# Patient Record
Sex: MALE | Race: WHITE | NOT HISPANIC OR LATINO | Employment: UNEMPLOYED | URBAN - METROPOLITAN AREA
[De-identification: names, ages, dates, MRNs, and addresses within clinical notes are randomized per-mention and may not be internally consistent; named-entity substitution may affect disease eponyms.]

---

## 2017-02-06 ENCOUNTER — HISTORICAL (OUTPATIENT)
Dept: ADMINISTRATIVE | Facility: HOSPITAL | Age: 42
End: 2017-02-06

## 2021-06-08 ENCOUNTER — OCCUPATIONAL HEALTH (OUTPATIENT)
Dept: URGENT CARE | Facility: CLINIC | Age: 46
End: 2021-06-08

## 2021-06-08 PROCEDURE — 80305 PR NON-DOT DRUG SCREENS: ICD-10-PCS | Mod: S$GLB,,, | Performed by: EMERGENCY MEDICINE

## 2021-06-08 PROCEDURE — 80305 DRUG TEST PRSMV DIR OPT OBS: CPT | Mod: S$GLB,,, | Performed by: EMERGENCY MEDICINE

## 2022-05-19 ENCOUNTER — HOSPITAL ENCOUNTER (EMERGENCY)
Facility: HOSPITAL | Age: 47
Discharge: HOME | End: 2022-05-19
Attending: EMERGENCY MEDICINE

## 2022-05-19 VITALS
OXYGEN SATURATION: 100 % | SYSTOLIC BLOOD PRESSURE: 130 MMHG | TEMPERATURE: 99 F | RESPIRATION RATE: 16 BRPM | HEART RATE: 81 BPM | WEIGHT: 180 LBS | DIASTOLIC BLOOD PRESSURE: 89 MMHG

## 2022-05-19 DIAGNOSIS — F41.9 ANXIETY: ICD-10-CM

## 2022-05-19 DIAGNOSIS — R20.2 PARESTHESIAS: Primary | ICD-10-CM

## 2022-05-19 LAB
ALBUMIN SERPL BCP-MCNC: 4.8 G/DL (ref 3.5–5.2)
ALP SERPL-CCNC: 72 U/L (ref 55–135)
ALT SERPL W/O P-5'-P-CCNC: 29 U/L (ref 10–44)
AMPHET+METHAMPHET UR QL: NEGATIVE
ANION GAP SERPL CALC-SCNC: 13 MMOL/L (ref 8–16)
AST SERPL-CCNC: 27 U/L (ref 10–40)
BARBITURATES UR QL SCN>200 NG/ML: NEGATIVE
BASOPHILS # BLD AUTO: 0.07 K/UL (ref 0–0.2)
BASOPHILS NFR BLD: 0.6 % (ref 0–1.9)
BENZODIAZ UR QL SCN>200 NG/ML: NEGATIVE
BILIRUB SERPL-MCNC: 0.9 MG/DL (ref 0.1–1)
BNP SERPL-MCNC: 16 PG/ML (ref 0–99)
BUN SERPL-MCNC: 18 MG/DL (ref 6–20)
BZE UR QL SCN: NEGATIVE
CALCIUM SERPL-MCNC: 8.8 MG/DL (ref 8.7–10.5)
CANNABINOIDS UR QL SCN: ABNORMAL
CHLORIDE SERPL-SCNC: 96 MMOL/L (ref 95–110)
CO2 SERPL-SCNC: 25 MMOL/L (ref 23–29)
CREAT SERPL-MCNC: 1 MG/DL (ref 0.5–1.4)
CREAT UR-MCNC: 10 MG/DL (ref 23–375)
DIFFERENTIAL METHOD: ABNORMAL
EOSINOPHIL # BLD AUTO: 0.1 K/UL (ref 0–0.5)
EOSINOPHIL NFR BLD: 0.8 % (ref 0–8)
ERYTHROCYTE [DISTWIDTH] IN BLOOD BY AUTOMATED COUNT: 13.8 % (ref 11.5–14.5)
EST. GFR  (AFRICAN AMERICAN): >60 ML/MIN/1.73 M^2
EST. GFR  (NON AFRICAN AMERICAN): >60 ML/MIN/1.73 M^2
GLUCOSE SERPL-MCNC: 61 MG/DL (ref 70–110)
HCT VFR BLD AUTO: 45.3 % (ref 40–54)
HGB BLD-MCNC: 15.1 G/DL (ref 14–18)
IMM GRANULOCYTES # BLD AUTO: 0.04 K/UL (ref 0–0.04)
IMM GRANULOCYTES NFR BLD AUTO: 0.3 % (ref 0–0.5)
INR PPP: 1.2
LYMPHOCYTES # BLD AUTO: 2.1 K/UL (ref 1–4.8)
LYMPHOCYTES NFR BLD: 18.3 % (ref 18–48)
MCH RBC QN AUTO: 28.6 PG (ref 27–31)
MCHC RBC AUTO-ENTMCNC: 33.3 G/DL (ref 32–36)
MCV RBC AUTO: 86 FL (ref 82–98)
MONOCYTES # BLD AUTO: 0.9 K/UL (ref 0.3–1)
MONOCYTES NFR BLD: 7.3 % (ref 4–15)
NEUTROPHILS # BLD AUTO: 8.5 K/UL (ref 1.8–7.7)
NEUTROPHILS NFR BLD: 72.7 % (ref 38–73)
NRBC BLD-RTO: 0 /100 WBC
OPIATES UR QL SCN: NEGATIVE
PCP UR QL SCN>25 NG/ML: NEGATIVE
PLATELET # BLD AUTO: 341 K/UL (ref 150–450)
PMV BLD AUTO: 10.2 FL (ref 9.2–12.9)
POTASSIUM SERPL-SCNC: 3.2 MMOL/L (ref 3.5–5.1)
PROT SERPL-MCNC: 7.8 G/DL (ref 6–8.4)
PROTHROMBIN TIME: 14.4 SEC (ref 11.4–13.7)
RBC # BLD AUTO: 5.28 M/UL (ref 4.6–6.2)
SODIUM SERPL-SCNC: 134 MMOL/L (ref 136–145)
TOXICOLOGY INFORMATION: ABNORMAL
TROPONIN I SERPL DL<=0.01 NG/ML-MCNC: <0.03 NG/ML
WBC # BLD AUTO: 11.72 K/UL (ref 3.9–12.7)

## 2022-05-19 PROCEDURE — 96376 TX/PRO/DX INJ SAME DRUG ADON: CPT

## 2022-05-19 PROCEDURE — 84484 ASSAY OF TROPONIN QUANT: CPT | Performed by: EMERGENCY MEDICINE

## 2022-05-19 PROCEDURE — 85025 COMPLETE CBC W/AUTO DIFF WBC: CPT | Performed by: EMERGENCY MEDICINE

## 2022-05-19 PROCEDURE — 63600175 PHARM REV CODE 636 W HCPCS: Performed by: EMERGENCY MEDICINE

## 2022-05-19 PROCEDURE — 83880 ASSAY OF NATRIURETIC PEPTIDE: CPT | Performed by: EMERGENCY MEDICINE

## 2022-05-19 PROCEDURE — 25000003 PHARM REV CODE 250: Performed by: EMERGENCY MEDICINE

## 2022-05-19 PROCEDURE — 80307 DRUG TEST PRSMV CHEM ANLYZR: CPT | Performed by: EMERGENCY MEDICINE

## 2022-05-19 PROCEDURE — 96374 THER/PROPH/DIAG INJ IV PUSH: CPT

## 2022-05-19 PROCEDURE — 85610 PROTHROMBIN TIME: CPT | Performed by: EMERGENCY MEDICINE

## 2022-05-19 PROCEDURE — 99284 EMERGENCY DEPT VISIT MOD MDM: CPT | Mod: 25

## 2022-05-19 PROCEDURE — 80053 COMPREHEN METABOLIC PANEL: CPT | Performed by: EMERGENCY MEDICINE

## 2022-05-19 PROCEDURE — 93010 EKG 12-LEAD: ICD-10-PCS | Mod: ,,, | Performed by: SPECIALIST

## 2022-05-19 PROCEDURE — 93005 ELECTROCARDIOGRAM TRACING: CPT | Performed by: SPECIALIST

## 2022-05-19 PROCEDURE — 93010 ELECTROCARDIOGRAM REPORT: CPT | Mod: ,,, | Performed by: SPECIALIST

## 2022-05-19 RX ORDER — NAPROXEN SODIUM 220 MG/1
324 TABLET, FILM COATED ORAL ONCE
Status: COMPLETED | OUTPATIENT
Start: 2022-05-19 | End: 2022-05-19

## 2022-05-19 RX ORDER — LOSARTAN POTASSIUM 50 MG/1
50 TABLET ORAL DAILY
COMMUNITY
Start: 2022-02-19

## 2022-05-19 RX ORDER — POTASSIUM CHLORIDE 750 MG/1
50 CAPSULE, EXTENDED RELEASE ORAL ONCE
Status: COMPLETED | OUTPATIENT
Start: 2022-05-19 | End: 2022-05-19

## 2022-05-19 RX ORDER — ESCITALOPRAM OXALATE 20 MG/1
30 TABLET ORAL DAILY
COMMUNITY
Start: 2022-04-04

## 2022-05-19 RX ORDER — ASPIRIN 81 MG/1
81 TABLET ORAL DAILY
COMMUNITY

## 2022-05-19 RX ORDER — LORAZEPAM 2 MG/ML
1 INJECTION INTRAMUSCULAR
Status: COMPLETED | OUTPATIENT
Start: 2022-05-19 | End: 2022-05-19

## 2022-05-19 RX ORDER — OMEPRAZOLE 20 MG/1
20 CAPSULE, DELAYED RELEASE ORAL DAILY
COMMUNITY

## 2022-05-19 RX ADMIN — ASPIRIN 81 MG 324 MG: 81 TABLET ORAL at 06:05

## 2022-05-19 RX ADMIN — LORAZEPAM 1 MG: 2 INJECTION INTRAMUSCULAR; INTRAVENOUS at 07:05

## 2022-05-19 RX ADMIN — LORAZEPAM 1 MG: 2 INJECTION INTRAMUSCULAR; INTRAVENOUS at 06:05

## 2022-05-19 RX ADMIN — POTASSIUM CHLORIDE 50 MEQ: 750 CAPSULE, EXTENDED RELEASE ORAL at 08:05

## 2022-05-19 NOTE — ED PROVIDER NOTES
Encounter Date: 5/19/2022       History     Chief Complaint   Patient presents with    Numbness     Bilateral hand numbness beginning 30min ago.      47-year-old male presented to emergency department with anxiety and felt that his hands or tingling and numb and cold.  Patient very anxious upon arrival.  Patient denies any fever chills or nausea vomiting.  Patient states hands feel numb and cold and has tingling bilaterally.  Denies any focal weakness.  Denies chest pain or shortness of breath.  Patient admits using marijuana.        Review of patient's allergies indicates:  No Known Allergies  History reviewed. No pertinent past medical history.  No past surgical history on file.  No family history on file.     Review of Systems   Constitutional: Negative.    HENT: Negative.    Eyes: Negative.    Respiratory: Negative.    Cardiovascular: Negative.    Gastrointestinal: Negative.    Endocrine: Negative.    Genitourinary: Negative.    Musculoskeletal: Negative.    Skin: Negative.    Allergic/Immunologic: Negative.    Neurological: Negative.         Tingling of the hands   Hematological: Negative.    Psychiatric/Behavioral: The patient is nervous/anxious.    All other systems reviewed and are negative.      Physical Exam     Initial Vitals [05/19/22 1702]   BP Pulse Resp Temp SpO2   (!) 169/96 110 20 98.5 °F (36.9 °C) 100 %      MAP       --         Physical Exam    Nursing note and vitals reviewed.  Constitutional: He appears well-developed and well-nourished.   HENT:   Head: Normocephalic and atraumatic.   Nose: Nose normal.   Eyes: Conjunctivae and EOM are normal.   Neck: No tracheal deviation present.   Normal range of motion.  Cardiovascular: Normal rate, regular rhythm, normal heart sounds and intact distal pulses. Exam reveals no friction rub.    No murmur heard.  Pulmonary/Chest: Breath sounds normal. No respiratory distress. He has no wheezes. He has no rales. He exhibits no tenderness.   Abdominal: Abdomen  is soft. He exhibits no distension. There is no abdominal tenderness.   Musculoskeletal:         General: No tenderness. Normal range of motion.      Cervical back: Normal range of motion.     Neurological: He is alert and oriented to person, place, and time. He has normal strength. He displays normal reflexes. No sensory deficit. GCS score is 15. GCS eye subscore is 4. GCS verbal subscore is 5. GCS motor subscore is 6.   Skin: Skin is warm and dry. Capillary refill takes less than 2 seconds.   Psychiatric: He has a normal mood and affect. Thought content normal.         ED Course   Procedures  Labs Reviewed   CBC W/ AUTO DIFFERENTIAL - Abnormal; Notable for the following components:       Result Value    Gran # (ANC) 8.5 (*)     All other components within normal limits   COMPREHENSIVE METABOLIC PANEL - Abnormal; Notable for the following components:    Sodium 134 (*)     Potassium 3.2 (*)     Glucose 61 (*)     All other components within normal limits   PROTIME-INR - Abnormal; Notable for the following components:    PT 14.4 (*)     All other components within normal limits   DRUG SCREEN PANEL, URINE EMERGENCY - Abnormal; Notable for the following components:    THC Presumptive Positive (*)     Creatinine, Urine 10.0 (*)     All other components within normal limits    Narrative:     Specimen Source->Urine   B-TYPE NATRIURETIC PEPTIDE   TROPONIN I     EKG Readings: (Independently Interpreted)   Initial Reading: No STEMI. Rhythm: Normal Sinus Rhythm. Ectopy: No Ectopy. Conduction: Normal.       Imaging Results          X-Ray Chest AP Portable (Final result)  Result time 05/19/22 17:49:42    Final result by Lissette Tena IV, MD (05/19/22 17:49:42)                 Narrative:    Chest, single view    HISTORY: Chest pain.    No prior exam is available for comparison.    The heart size is within normal limits and there is no evidence of acute pulmonary vascular engorgement.    The lungs are appropriately expanded.  There are no confluent areas of airspace disease or significant volume loss and there is no effusion. There is mild biapical pleural thickening.    IMPRESSION:    No acute cardiopulmonary disease.    Electronically signed by:  Lissette Tena MD  5/19/2022 5:49 PM CDT Workstation: [id number]                               Medications   aspirin chewable tablet 324 mg (324 mg Oral Given 5/19/22 1818)   lorazepam injection 1 mg (1 mg Intravenous Given 5/19/22 1819)   lorazepam injection 1 mg (1 mg Intravenous Given 5/19/22 1920)   potassium chloride CR capsule 50 mEq (50 mEq Oral Given 5/19/22 2035)     Medical Decision Making:   Differential Diagnosis:   47-year-old male presented emergency department with anxiety.  Patient complained of tingling and numbness of both hands and was very anxious upon arrival and felt he is hand side not having circulation and or cold.  Patient has good radial pulses bilaterally however the hands appear to be cold and placed in warm water and then patient's symptoms improved.  Screening labs and cardiac workup reviewed and unremarkable and patient advised to stop using drugs.  Patient said he used marijuana prior to this.  Patient gradually felt better after treatment with benzodiazepines and as feeling better and workup negative discharged with instructions and follow-up.  Patient neurologically intact at this time.  Hypokalemia treated  Clinical Tests:   Lab Tests: Reviewed  Radiological Study: Reviewed  Medical Tests: Reviewed                      Clinical Impression:   Final diagnoses:  [R20.2] Paresthesias (Primary)  [F41.9] Anxiety          ED Disposition Condition    Discharge Stable        ED Prescriptions     None        Follow-up Information     Follow up With Specialties Details Why Contact [facility]  In 2 days  [address] [facility]  [place] [address]  [phone]      [facility], Psychiatry In 2 days  [address]  [facility] [address]  [phone]             Wiliam Batres MD  05/19/22 2038

## 2022-05-20 NOTE — DISCHARGE INSTRUCTIONS
Drink lots of fluids.  Rest.  Return to emergency department for worsening symptoms or any problems.  Do not do drugs.

## 2024-01-21 ENCOUNTER — HOSPITAL ENCOUNTER (EMERGENCY)
Facility: HOSPITAL | Age: 49
Discharge: LEFT BEFORE DISCHARGE | End: 2024-01-21
Attending: STUDENT IN AN ORGANIZED HEALTH CARE EDUCATION/TRAINING PROGRAM
Payer: MEDICAID

## 2024-01-21 VITALS
WEIGHT: 166 LBS | SYSTOLIC BLOOD PRESSURE: 138 MMHG | TEMPERATURE: 98 F | RESPIRATION RATE: 20 BRPM | OXYGEN SATURATION: 99 % | HEART RATE: 109 BPM | DIASTOLIC BLOOD PRESSURE: 98 MMHG | BODY MASS INDEX: 26.06 KG/M2 | HEIGHT: 67 IN

## 2024-01-21 DIAGNOSIS — R20.9 COLD HANDS: Primary | ICD-10-CM

## 2024-01-21 DIAGNOSIS — R20.2 LEFT HAND PARESTHESIA: ICD-10-CM

## 2024-01-21 DIAGNOSIS — F12.90 MARIJUANA USE: ICD-10-CM

## 2024-01-21 LAB
ALBUMIN SERPL BCP-MCNC: 4.4 G/DL (ref 3.5–5.2)
ALP SERPL-CCNC: 79 U/L (ref 55–135)
ALT SERPL W/O P-5'-P-CCNC: 17 U/L (ref 10–44)
ANION GAP SERPL CALC-SCNC: 7 MMOL/L (ref 8–16)
AST SERPL-CCNC: 20 U/L (ref 10–40)
BASOPHILS # BLD AUTO: 0.06 K/UL (ref 0–0.2)
BASOPHILS NFR BLD: 0.6 % (ref 0–1.9)
BILIRUB SERPL-MCNC: 0.6 MG/DL (ref 0.1–1)
BUN SERPL-MCNC: 8 MG/DL (ref 6–20)
CALCIUM SERPL-MCNC: 9.1 MG/DL (ref 8.7–10.5)
CHLORIDE SERPL-SCNC: 105 MMOL/L (ref 95–110)
CO2 SERPL-SCNC: 26 MMOL/L (ref 23–29)
CREAT SERPL-MCNC: 1 MG/DL (ref 0.5–1.4)
DIFFERENTIAL METHOD BLD: NORMAL
EOSINOPHIL # BLD AUTO: 0.1 K/UL (ref 0–0.5)
EOSINOPHIL NFR BLD: 1 % (ref 0–8)
ERYTHROCYTE [DISTWIDTH] IN BLOOD BY AUTOMATED COUNT: 13.2 % (ref 11.5–14.5)
EST. GFR  (NO RACE VARIABLE): >60 ML/MIN/1.73 M^2
GLUCOSE SERPL-MCNC: 84 MG/DL (ref 70–110)
GLUCOSE SERPL-MCNC: 86 MG/DL (ref 70–110)
HCT VFR BLD AUTO: 46.8 % (ref 40–54)
HGB BLD-MCNC: 15.7 G/DL (ref 14–18)
IMM GRANULOCYTES # BLD AUTO: 0.04 K/UL (ref 0–0.04)
IMM GRANULOCYTES NFR BLD AUTO: 0.4 % (ref 0–0.5)
LYMPHOCYTES # BLD AUTO: 2.5 K/UL (ref 1–4.8)
LYMPHOCYTES NFR BLD: 23.7 % (ref 18–48)
MCH RBC QN AUTO: 29.3 PG (ref 27–31)
MCHC RBC AUTO-ENTMCNC: 33.5 G/DL (ref 32–36)
MCV RBC AUTO: 87 FL (ref 82–98)
MONOCYTES # BLD AUTO: 0.7 K/UL (ref 0.3–1)
MONOCYTES NFR BLD: 6.7 % (ref 4–15)
NEUTROPHILS # BLD AUTO: 7.1 K/UL (ref 1.8–7.7)
NEUTROPHILS NFR BLD: 67.6 % (ref 38–73)
NRBC BLD-RTO: 0 /100 WBC
PLATELET # BLD AUTO: 256 K/UL (ref 150–450)
PMV BLD AUTO: 11.1 FL (ref 9.2–12.9)
POTASSIUM SERPL-SCNC: 3.6 MMOL/L (ref 3.5–5.1)
PROT SERPL-MCNC: 6.9 G/DL (ref 6–8.4)
RBC # BLD AUTO: 5.36 M/UL (ref 4.6–6.2)
SODIUM SERPL-SCNC: 138 MMOL/L (ref 136–145)
WBC # BLD AUTO: 10.49 K/UL (ref 3.9–12.7)

## 2024-01-21 PROCEDURE — 99284 EMERGENCY DEPT VISIT MOD MDM: CPT | Mod: 25

## 2024-01-21 PROCEDURE — 85025 COMPLETE CBC W/AUTO DIFF WBC: CPT | Performed by: STUDENT IN AN ORGANIZED HEALTH CARE EDUCATION/TRAINING PROGRAM

## 2024-01-21 PROCEDURE — 82962 GLUCOSE BLOOD TEST: CPT

## 2024-01-21 PROCEDURE — 93005 ELECTROCARDIOGRAM TRACING: CPT | Performed by: INTERNAL MEDICINE

## 2024-01-21 PROCEDURE — 80053 COMPREHEN METABOLIC PANEL: CPT | Performed by: STUDENT IN AN ORGANIZED HEALTH CARE EDUCATION/TRAINING PROGRAM

## 2024-01-21 PROCEDURE — 93010 ELECTROCARDIOGRAM REPORT: CPT | Mod: ,,, | Performed by: INTERNAL MEDICINE

## 2024-01-21 NOTE — ED NOTES
Pt left with IV before being discharged. Pt was seen leaving the ER on the cameras by security. Pt phone went straight to voicemail. [place] police notified and sent to pt address. Pt not at the resident.

## 2024-01-21 NOTE — DISCHARGE INSTRUCTIONS

## 2024-01-21 NOTE — ED PROVIDER NOTES
Encounter Date: 1/21/2024       History     Chief Complaint   Patient presents with    Cold Extremity     Pt says he has cold hands but is still having a normal pulse ox and that's not possible.  Pt says he had TMI but didn't have a CT or was ever admitted to the hospital     Patient presents for evaluation of cold hands.  Both his hands are cold.  He denies any other symptoms.  He smells like marijuana.      Review of patient's allergies indicates:  No Known Allergies  No past medical history on file.  No past surgical history on file.  No family history on file.     Review of Systems    Physical Exam     Initial Vitals   BP Pulse Resp Temp SpO2   01/21/24 0309 01/21/24 0307 01/21/24 0307 01/21/24 0307 01/21/24 0307   (!) 138/98 109 20 98.1 °F (36.7 °C) 99 %      MAP       --                Physical Exam    Nursing note and vitals reviewed.  Constitutional: He appears well-developed and well-nourished.   HENT:   Head: Normocephalic and atraumatic.   Mouth/Throat: Oropharynx is clear and moist.   Eyes: Conjunctivae and EOM are normal. Pupils are equal, round, and reactive to light.   Neck: No thyromegaly present.   Normal range of motion.  Cardiovascular:  Normal rate, regular rhythm and intact distal pulses.           Pulmonary/Chest: Breath sounds normal. No respiratory distress. He has no wheezes.   Abdominal: Abdomen is soft. Bowel sounds are normal. He exhibits no distension. There is no abdominal tenderness.   Musculoskeletal:         General: No tenderness or edema. Normal range of motion.      Cervical back: Normal range of motion.     Neurological: He is alert and oriented to person, place, and time. He has normal strength. No cranial nerve deficit.   Skin: Skin is warm and dry. No rash noted.   Psychiatric: He has a normal mood and affect. His behavior is normal. Thought content normal.         ED Course   Procedures  Labs Reviewed   COMPREHENSIVE METABOLIC PANEL - Abnormal; Notable for the following  components:       Result Value    Anion Gap 7 (*)     All other components within normal limits   CBC W/ AUTO DIFFERENTIAL   POCT GLUCOSE   POCT GLUCOSE MONITORING CONTINUOUS     EKG Readings: (Independently Interpreted)   EKG with regular rate, regular rhythm, normal axis, no acute ST elevations or depressions, normal MN, QRS and QT interval. Interpreted by me.           Imaging Results    None          Medications - No data to display  Medical Decision Making  48-year-old male presents for evaluation of cold hands.  Vitals normal.  Patient has normal sensation in his bilateral hands, normal cap refill.  He has normal distal pulses, doubt arterial injury.  Screening labs within normal limits.  Doubt life-threatening cause of patient's sensation of cold hands.  He has no discernible neurological deficits to warrant advanced imaging or suggest acute neurological disease.  Will refer to PCP as an outpatient.  Stable for discharge with outpatient follow-up.    Amount and/or Complexity of Data Reviewed  Labs: ordered.               ED Course as of 01/21/24 0452   Sun Jan 21, 2024   0451 POCT glucose [BS]   0451 CBC auto differential [BS]   0451 Comprehensive metabolic panel(!) [BS]      ED Course User Index  [BS] Keron Kelsey MD                           Clinical Impression:  Final diagnoses:  [R20.2] Left hand paresthesia  [R20.9] Cold hands (Primary)  [F12.90] Marijuana use          ED Disposition Condition    Discharge Stable          ED Prescriptions    None       Follow-up Information       Follow up With Specialties Details Why Contact [facility]    [employer] [facility]  Schedule an appointment as soon as possible for a visit in 1 day to establish primary care physician [facility] [address] [facility] [address]  [phone]               Keron Kelsey MD  01/21/24 0457

## 2024-02-02 ENCOUNTER — HOSPITAL ENCOUNTER (EMERGENCY)
Facility: HOSPITAL | Age: 49
Discharge: HOME | End: 2024-02-03
Attending: EMERGENCY MEDICINE
Payer: MEDICAID

## 2024-02-02 DIAGNOSIS — R20.2 TINGLING IN EXTREMITIES: Primary | ICD-10-CM

## 2024-02-02 DIAGNOSIS — G45.9 TIA (TRANSIENT ISCHEMIC ATTACK): ICD-10-CM

## 2024-02-02 PROCEDURE — 99285 EMERGENCY DEPT VISIT HI MDM: CPT

## 2024-02-03 VITALS
WEIGHT: 170 LBS | TEMPERATURE: 98 F | HEIGHT: 67 IN | RESPIRATION RATE: 18 BRPM | SYSTOLIC BLOOD PRESSURE: 122 MMHG | BODY MASS INDEX: 26.68 KG/M2 | OXYGEN SATURATION: 95 % | HEART RATE: 63 BPM | DIASTOLIC BLOOD PRESSURE: 79 MMHG

## 2024-02-03 LAB
ALBUMIN SERPL BCP-MCNC: 4 G/DL (ref 3.5–5.2)
ALP SERPL-CCNC: 76 U/L (ref 55–135)
ALT SERPL W/O P-5'-P-CCNC: 14 U/L (ref 10–44)
ANION GAP SERPL CALC-SCNC: 7 MMOL/L (ref 8–16)
AST SERPL-CCNC: 16 U/L (ref 10–40)
BASOPHILS # BLD AUTO: 0.06 K/UL (ref 0–0.2)
BASOPHILS NFR BLD: 0.8 % (ref 0–1.9)
BILIRUB SERPL-MCNC: 0.3 MG/DL (ref 0.1–1)
BUN SERPL-MCNC: 13 MG/DL (ref 6–20)
CALCIUM SERPL-MCNC: 8.8 MG/DL (ref 8.7–10.5)
CHLORIDE SERPL-SCNC: 105 MMOL/L (ref 95–110)
CO2 SERPL-SCNC: 26 MMOL/L (ref 23–29)
CREAT SERPL-MCNC: 1 MG/DL (ref 0.5–1.4)
DIFFERENTIAL METHOD BLD: NORMAL
EOSINOPHIL # BLD AUTO: 0.3 K/UL (ref 0–0.5)
EOSINOPHIL NFR BLD: 4.2 % (ref 0–8)
ERYTHROCYTE [DISTWIDTH] IN BLOOD BY AUTOMATED COUNT: 13 % (ref 11.5–14.5)
EST. GFR  (NO RACE VARIABLE): >60 ML/MIN/1.73 M^2
GLUCOSE SERPL-MCNC: 92 MG/DL (ref 70–110)
HCT VFR BLD AUTO: 44.1 % (ref 40–54)
HGB BLD-MCNC: 14.5 G/DL (ref 14–18)
IMM GRANULOCYTES # BLD AUTO: 0.02 K/UL (ref 0–0.04)
IMM GRANULOCYTES NFR BLD AUTO: 0.3 % (ref 0–0.5)
LYMPHOCYTES # BLD AUTO: 2.5 K/UL (ref 1–4.8)
LYMPHOCYTES NFR BLD: 33 % (ref 18–48)
MCH RBC QN AUTO: 28.5 PG (ref 27–31)
MCHC RBC AUTO-ENTMCNC: 32.9 G/DL (ref 32–36)
MCV RBC AUTO: 87 FL (ref 82–98)
MONOCYTES # BLD AUTO: 0.5 K/UL (ref 0.3–1)
MONOCYTES NFR BLD: 7 % (ref 4–15)
NEUTROPHILS # BLD AUTO: 4.1 K/UL (ref 1.8–7.7)
NEUTROPHILS NFR BLD: 54.7 % (ref 38–73)
NRBC BLD-RTO: 0 /100 WBC
PLATELET # BLD AUTO: 226 K/UL (ref 150–450)
PMV BLD AUTO: 11.5 FL (ref 9.2–12.9)
POTASSIUM SERPL-SCNC: 3.7 MMOL/L (ref 3.5–5.1)
PROT SERPL-MCNC: 6.2 G/DL (ref 6–8.4)
RBC # BLD AUTO: 5.09 M/UL (ref 4.6–6.2)
SODIUM SERPL-SCNC: 138 MMOL/L (ref 136–145)
TROPONIN I SERPL HS-MCNC: <2.3 PG/ML (ref 0–14.9)
WBC # BLD AUTO: 7.54 K/UL (ref 3.9–12.7)

## 2024-02-03 PROCEDURE — 25500020 PHARM REV CODE 255: Performed by: EMERGENCY MEDICINE

## 2024-02-03 PROCEDURE — 80053 COMPREHEN METABOLIC PANEL: CPT | Performed by: EMERGENCY MEDICINE

## 2024-02-03 PROCEDURE — 85025 COMPLETE CBC W/AUTO DIFF WBC: CPT | Performed by: EMERGENCY MEDICINE

## 2024-02-03 PROCEDURE — 84484 ASSAY OF TROPONIN QUANT: CPT | Performed by: EMERGENCY MEDICINE

## 2024-02-03 RX ORDER — GABAPENTIN 300 MG/1
300 CAPSULE ORAL 3 TIMES DAILY
Qty: 90 CAPSULE | Refills: 11 | Status: SHIPPED | OUTPATIENT
Start: 2024-02-03 | End: 2025-02-02

## 2024-02-03 RX ADMIN — IOHEXOL 100 ML: 350 INJECTION, SOLUTION INTRAVENOUS at 04:02

## 2024-02-03 NOTE — ED PROVIDER NOTES
"Encounter Date: 2/2/2024       History     Chief Complaint   Patient presents with    Tingling     Pt says he may have had  "TMI" about a year ago, but that he didn't any CT scans at that time.  Pt said "TMIs" run in his family.  Pt says at that time he felt like his hand was in warm water.  Pt says he now needs a neurologist referral for the TMI     48 year male with no significant past medical history presents secondary to tingling sensation in his left upper extremity.  Patient states he has had issues like this previously and was supposed to go to a neurologist but has not had follow-up since then.  Patient states his symptoms began over a year ago and has not been solved.  Denies any chest pain, shortness breath, nausea, vomiting, fever, chills or sweats associated.  Patient is otherwise stable and has no other complaints.      Review of patient's allergies indicates:  No Known Allergies  No past medical history on file.  No past surgical history on file.  No family history on file.     Review of Systems   Neurological:  Positive for numbness (Left upper extremity with tingling sensation).   All other systems reviewed and are negative.      Physical Exam     Initial Vitals [02/02/24 2257]   BP Pulse Resp Temp SpO2   (!) 133/92 92 18 97.6 °F (36.4 °C) 97 %      MAP       --         Physical Exam    Nursing note and vitals reviewed.  Constitutional: He appears well-developed and well-nourished. He is not diaphoretic. No distress.   HENT:   Head: Normocephalic and atraumatic.   Mouth/Throat: Oropharynx is clear and moist.   Eyes: Conjunctivae and EOM are normal. Pupils are equal, round, and reactive to light.   Neck: Neck supple. No thyromegaly present. No JVD present.   Normal range of motion.  Cardiovascular:  Normal rate, regular rhythm and normal heart sounds.     Exam reveals no gallop and no friction rub.       No murmur heard.  Pulmonary/Chest: Breath sounds normal. No respiratory distress. He has no " wheezes. He exhibits no tenderness.   Abdominal: Abdomen is soft. Bowel sounds are normal. He exhibits no distension. There is no abdominal tenderness. There is no rebound and no guarding.   Musculoskeletal:         General: No tenderness or edema. Normal range of motion.      Cervical back: Normal range of motion and neck supple.     Neurological: He is alert and oriented to person, place, and time. He has normal strength. A sensory deficit (left upper extremity) is present. No cranial nerve deficit.   Skin: Skin is warm and dry. Capillary refill takes less than 2 seconds. No rash noted. No erythema.   Psychiatric: He has a normal mood and affect.         ED Course   Procedures  Labs Reviewed   COMPREHENSIVE METABOLIC PANEL - Abnormal; Notable for the following components:       Result Value    Anion Gap 7 (*)     All other components within normal limits   CBC W/ AUTO DIFFERENTIAL   TROPONIN I HIGH SENSITIVITY          Imaging Results              CTA Head and Neck (xpd) (Final result)  Result time 02/03/24 04:53:17   Procedure changed from CTA Neck     Final result by Adilson Underwood MD (02/03/24 04:53:17)                   Narrative:    EXAM DESCRIPTION:  CTA HEAD AND NECK (XPD) 2/3/2024 4:25 AM CST  RadLex: CT HEAD NECK ANGIOGRAPHY WITH IV CONTRAST    CLINICAL HISTORY:  48 years Male, tia    COMPARISON:  None    TECHNIQUE: Multiple axial images of the head and neck were obtained following a dynamic injection of intravenous contrast using a CT angio protocol. Thick slab MIP images of the head and neck were also obtained. Measurements of carotid stenosis are based on NASCET criteria.    The protocol utilizes one or more of the following dose reduction techniques: automated exposure control, adjustment of mA and/or kV according to patient size, and/or use of iterative reconstruction technique.    FINDINGS:    CTA BRAIN:  INTERNAL CAROTID ARTERIES: The visualized portions of the internal carotid arteries are patent  bilaterally.    MIDDLE CEREBRAL ARTERIES: The right M1 segment is hypoplastic relative to the left. The middle cerebral arteries are patent bilaterally.    ANTERIOR CEREBRAL ARTERIES: The anterior cerebral arteries are patent bilaterally.    ANTERIOR COMMUNICATING ARTERY: Tiny infundibulum grossly measuring 1.7 mm is appreciated. The anterior communicating artery is patent.    POSTERIOR CEREBRAL ARTERIES: Fetal origin is visualized on the right. The posterior cerebral arteries are patent bilaterally.    VERTEBRAL ARTERIES: The visualized portions of the distal vertebral arteries are patent bilaterally. Vertebrobasilar junction appears normal.    BASILAR ARTERY: The basilar artery is patent. Basilar tip appears unremarkable.    SUPERIOR CEREBELLAR ARTERIES: The superior cerebellar arteries are patent bilaterally.    ANTERIOR INFERIOR CEREBELLAR ARTERIES: The anterior inferior cerebellar arteries are not well visualized bilaterally.    POSTERIOR INFERIOR CEREBELLAR ARTERIES: The posterior inferior cerebellar arteries are patent bilaterally.    POSTERIOR COMMUNICATING ARTERY: The right posterior communicating artery is patent. The left vessel is not well-visualized.    VASCULAR MALFORMATIONS: No evidence of aneurysm or AVM is identified.    CTA NECK:  AORTIC ARCH: The visualized portion of the aortic arch appears unremarkable. The origins of the brachiocephalic trunk, left common carotid artery, and left subclavian artery appear unremarkable. Few nonspecific calcified and borderline prominent upper mediastinal lymph nodes are noted.    COMMON CAROTID ARTERIES: The common carotid arteries are patent bilaterally.    CAROTID BULBS: The carotid bulbs are patent bilaterally. No measurable stenosis is seen based on NASCET criteria.    INTERNAL CAROTID ARTERIES: The cervical portions of both internal carotid arteries are patent. No evidence of arterial dissection is seen.    EXTERNAL CAROTID ARTERIES: The visualized portions  of the proximal external carotid arteries are patent.    VERTEBRAL ARTERIES: The vertebral arteries are patent bilaterally. No evidence of dissection is seen.    SUBCLAVIAN ARTERIES: The visualized portions of the subclavian arteries are patent bilaterally.    IMPRESSION:  No evidence of large vessel occlusion or hemodynamically significant stenosis.    Please refer to body detailed report above for additional findings.    Electronically signed by:  Adilson Underwood MD  02/03/2024 04:53 AM CST Workstation: [id number]                                     CT Head Without Contrast (Final result)  Result time 02/03/24 04:45:22      Final result by Adilson Underwood MD (02/03/24 04:45:22)                   Narrative:    EXAM DESCRIPTION:  CT HEAD WITHOUT CONTRAST 2/3/2024 4:24 AM CST  Rad[facility]: CT HEAD WITHOUT IV CONTRAST    CLINICAL HISTORY:  48 years Male, Transient ischemic attack (TIA)    COMPARISON:  None    TECHNIQUE: Axial thin section CT images of the brain were obtained from the base of the skull to the vertex without intravenous contrast. Sagittal and coronal reconstructed images were also obtained.    The protocol utilizes one or more of the following dose reduction techniques: automated exposure control, adjustment of mA and/or kV according to patient size, and/or use of iterative reconstruction technique.    FINDINGS:    BRAIN: Gray-white matter differentiation is preserved. No evidence of an acute transcortical infarct is noted. Please note that sensitivity for subtle small acute infarct is suboptimal on CT.    CEREBROSPINAL FLUID SPACES: Ventricles, cerebral sulci, and basal cisterns are normal for patient's age. No hydrocephalus or ventricular entrapment is noted. No extraaxial fluid collection is noted.    MASS EFFECT: There is no mass effect or midline shift.    HEMORRHAGE: No intracranial hemorrhage is noted.    SELLA: The sella and suprasellar cistern appear unremarkable.    PARANASAL SINUSES: The visualized  portions of the paranasal sinuses demonstrate mucosal thickening.    MASTOID AIR CELLS: The visualized portions of the mastoid air cells are well aerated.    ORBITS: The visualized portions of the orbits appear unremarkable.    SOFT TISSUES: No scalp soft tissue swelling is noted.    CALVARIUM: No calvarial fracture is noted.    ATHEROSCLEROSIS: No calcified intracranial atherosclerosis is noted.    IMPRESSION:    NO ACUTE INTRACRANIAL BLEED, MIDLINE SHIFT, OR MASS EFFECT IS NOTED.    Electronically signed by:  Adilson Underwood MD  02/03/2024 04:45 AM [facility] Workstation: [id number]                                     Medications   sodium chloride 0.9% bolus 1,000 mL 1,000 mL (has no administration in time range)   iohexoL (OMNIPAQUE 350) injection 100 mL (100 mLs Intravenous Given 2/3/24 7269)     Medical Decision Making  [age] year male initial assessment in mild distress secondary to left upper extremity tingling sensation.  Patient is alert and oriented x3.  He is nontoxic-appearing and vitals stable at this time.    Differential diagnosis TIA, electrolyte abnormality, peripheral neuropathy    Amount and/or Complexity of Data Reviewed  Labs: ordered. Decision-making details documented in ED Course.  Radiology: ordered. Decision-making details documented in ED Course.    Risk  Prescription drug management.  Risk Details: Patient has been reassessed noted to have no acute changes in his condition.  Labs images unremarkable for any acute pathology requiring further hospital admission or treatment this time.  Patient will follow up with Neurology as instructed receive MRI as needed.  He is remained stable while in the ED and discharged home stable condition with follow-up as discussed.  Mr. Hathaway is aware of the plan and agreement with discharge.    Patient's plan and diagnosis was discussed. All questions were answered and patient was comfortable with the plan. This patient was personally seen and personally  examined by me and I personally performed the services described in this documentation.   Complexity of the visit is established by the note or I have spent at least the amount of time discussing findings, exam and/or radiographs or imaging studies.     MD uses EPIC and voice recognition software prone to occasional and minor errors that may persist in the medical record.                                        Clinical Impression:  Final diagnoses:  [R20.2] Tingling in extremities (Primary)  [G45.9] TIA (transient ischemic attack)          ED Disposition Condition    Discharge Stable          ED Prescriptions       Medication Sig Dispense Start Date End Date Auth. Provider    gabapentin (NEURONTIN) 300 MG capsule Take 1 capsule (300 mg total) by mouth 3 (three) times daily. 90 capsule 2/3/2024 2/2/2025 Simon Ruiz MD          Follow-up Information       Follow up With Specialties Details Why Contact Info Additional Information    [facility] - Emergency Dept Emergency Medicine  As needed, If symptoms worsen [address] [facility] [place] [address]  [phone] 1st floor             Simon Ruiz MD  02/03/24 0516

## 2024-02-03 NOTE — NURSING
"Pt states he thinks he "had a small stroke a few hours ago.  It runs in my family."  Pt states he is trying to get his health in order.  Has had everything checked out from the neck down and just needs a MRI now."  Pt was suppose to have a MRI  "a year ago" but had to cancel the appointment.   "

## 2024-03-26 ENCOUNTER — HOSPITAL ENCOUNTER (EMERGENCY)
Facility: HOSPITAL | Age: 49
Discharge: HOME | End: 2024-03-26
Attending: STUDENT IN AN ORGANIZED HEALTH CARE EDUCATION/TRAINING PROGRAM
Payer: MEDICAID

## 2024-03-26 VITALS
OXYGEN SATURATION: 100 % | BODY MASS INDEX: 26.63 KG/M2 | DIASTOLIC BLOOD PRESSURE: 88 MMHG | RESPIRATION RATE: 16 BRPM | TEMPERATURE: 98 F | WEIGHT: 170 LBS | HEART RATE: 98 BPM | SYSTOLIC BLOOD PRESSURE: 130 MMHG

## 2024-03-26 DIAGNOSIS — R20.2 PARESTHESIAS: Primary | ICD-10-CM

## 2024-03-26 DIAGNOSIS — R00.0 TACHYCARDIA: ICD-10-CM

## 2024-03-26 LAB
ALBUMIN SERPL BCP-MCNC: 4.5 G/DL (ref 3.5–5.2)
ALP SERPL-CCNC: 83 U/L (ref 55–135)
ALT SERPL W/O P-5'-P-CCNC: 14 U/L (ref 10–44)
ANION GAP SERPL CALC-SCNC: 8 MMOL/L (ref 8–16)
AST SERPL-CCNC: 19 U/L (ref 10–40)
BILIRUB SERPL-MCNC: 0.6 MG/DL (ref 0.1–1)
BUN SERPL-MCNC: 20 MG/DL (ref 6–20)
CALCIUM SERPL-MCNC: 10.1 MG/DL (ref 8.7–10.5)
CHLORIDE SERPL-SCNC: 101 MMOL/L (ref 95–110)
CO2 SERPL-SCNC: 29 MMOL/L (ref 23–29)
CREAT SERPL-MCNC: 1.6 MG/DL (ref 0.5–1.4)
CREAT SERPL-MCNC: 3.2 MG/DL (ref 0.5–1.4)
ERYTHROCYTE [DISTWIDTH] IN BLOOD BY AUTOMATED COUNT: 13.3 % (ref 11.5–14.5)
EST. GFR  (NO RACE VARIABLE): 52.8 ML/MIN/1.73 M^2
GLUCOSE SERPL-MCNC: 114 MG/DL (ref 70–110)
GLUCOSE SERPL-MCNC: 98 MG/DL (ref 70–110)
HCT VFR BLD AUTO: 48.4 % (ref 40–54)
HGB BLD-MCNC: 15.8 G/DL (ref 14–18)
MCH RBC QN AUTO: 28.6 PG (ref 27–31)
MCHC RBC AUTO-ENTMCNC: 32.6 G/DL (ref 32–36)
MCV RBC AUTO: 88 FL (ref 82–98)
PLATELET # BLD AUTO: 284 K/UL (ref 150–450)
PMV BLD AUTO: 11.1 FL (ref 9.2–12.9)
POTASSIUM SERPL-SCNC: 4.2 MMOL/L (ref 3.5–5.1)
PROT SERPL-MCNC: 7.5 G/DL (ref 6–8.4)
RBC # BLD AUTO: 5.53 M/UL (ref 4.6–6.2)
SAMPLE: ABNORMAL
SODIUM SERPL-SCNC: 138 MMOL/L (ref 136–145)
WBC # BLD AUTO: 14.56 K/UL (ref 3.9–12.7)

## 2024-03-26 PROCEDURE — 82565 ASSAY OF CREATININE: CPT

## 2024-03-26 PROCEDURE — 82962 GLUCOSE BLOOD TEST: CPT

## 2024-03-26 PROCEDURE — 85027 COMPLETE CBC AUTOMATED: CPT | Performed by: STUDENT IN AN ORGANIZED HEALTH CARE EDUCATION/TRAINING PROGRAM

## 2024-03-26 PROCEDURE — 36415 COLL VENOUS BLD VENIPUNCTURE: CPT | Performed by: STUDENT IN AN ORGANIZED HEALTH CARE EDUCATION/TRAINING PROGRAM

## 2024-03-26 PROCEDURE — 99285 EMERGENCY DEPT VISIT HI MDM: CPT | Mod: 25

## 2024-03-26 PROCEDURE — 80053 COMPREHEN METABOLIC PANEL: CPT | Performed by: STUDENT IN AN ORGANIZED HEALTH CARE EDUCATION/TRAINING PROGRAM

## 2024-03-27 NOTE — ED PROVIDER NOTES
Encounter Date: 3/26/2024       History     Chief Complaint   Patient presents with    Numbness     Reports feeling disoriented beginning around 2030 last, and reports left sided numbness  beginning 1800 previous history of TIA      49-year-old male presents for multiple complaints.  He states that he has been having left arm paresthesias coming and going for 36 hours, along with left-sided head pressure, left leg paresthesias as well.  They started last night.  He also reports feeling associated lightheadedness when he stands.  Patient reports taking Valium last night and then sleeping and then having similar symptoms today intermittently.  Patient had similar symptoms month ago, and then again 3 weeks ago.  Workup each of those hospital visits showed no acute abnormalities.  Patient reports he was diagnosed with TIA.  He has no other medical problems, takes no medications on a regular basis, denies any drug use.      Review of patient's allergies indicates:  No Known Allergies  No past medical history on file.  No past surgical history on file.  No family history on file.     Review of Systems   Neurological:  Positive for numbness.       Physical Exam     Initial Vitals [03/26/24 2129]   BP Pulse Resp Temp SpO2   (!) 129/104 (!) 116 18 98.2 °F (36.8 °C) 100 %      MAP       --         Physical Exam    Nursing note and vitals reviewed.  Constitutional: He appears well-developed and well-nourished.   HENT:   Head: Normocephalic and atraumatic.   Mouth/Throat: Oropharynx is clear and moist.   Eyes: Conjunctivae and EOM are normal. Pupils are equal, round, and reactive to light.   Neck: No thyromegaly present.   Normal range of motion.  Cardiovascular:  Normal rate, regular rhythm and intact distal pulses.           Pulmonary/Chest: Breath sounds normal. No respiratory distress. He has no wheezes.   Abdominal: Abdomen is soft. Bowel sounds are normal. He exhibits no distension. There is no abdominal tenderness.    Musculoskeletal:         General: No tenderness or edema. Normal range of motion.      Cervical back: Normal range of motion.     Neurological: He is alert and oriented to person, place, and time. He has normal strength and normal reflexes. No cranial nerve deficit or sensory deficit. Coordination and gait normal. GCS eye subscore is 4. GCS verbal subscore is 5. GCS motor subscore is 6.   Normal finger to nose, heel to shin, rapid alternating movement.    Skin: Skin is warm and dry. No rash noted.   Psychiatric: He has a normal mood and affect. His behavior is normal. Thought content normal.         ED Course   Critical Care    Date/Time: 3/27/2024 3:38 AM    Performed by: Keron Kelsey MD  Authorized by: Keron Kelsey MD  Direct patient critical care time: 15 minutes  Additional history critical care time: 5 minutes  Ordering / reviewing critical care time: 5 minutes  Documentation critical care time: 5 minutes  Total critical care time (exclusive of procedural time) : 30 minutes  Critical care was necessary to treat or prevent imminent or life-threatening deterioration of the following conditions: CNS failure or compromise.  Critical care was time spent personally by me on the following activities: examination of patient, obtaining history from patient or surrogate, ordering and performing treatments and interventions, ordering and review of laboratory studies, ordering and review of radiographic studies, pulse oximetry and re-evaluation of patient's condition.        Labs Reviewed   COMPREHENSIVE METABOLIC PANEL - Abnormal; Notable for the following components:       Result Value    Creatinine 1.6 (*)     eGFR 52.8 (*)     All other components within normal limits   CBC WITHOUT DIFFERENTIAL - Abnormal; Notable for the following components:    WBC 14.56 (*)     All other components within normal limits   POCT GLUCOSE - Abnormal; Notable for the following components:    POC Glucose 114 (*)     All  other components within normal limits   ISTAT CREATININE - Abnormal; Notable for the following components:    POC Creatinine 3.2 (*)     All other components within normal limits   CBC W/ AUTO DIFFERENTIAL   COMPREHENSIVE METABOLIC PANEL          Imaging Results              MRI Brain Without Contrast (Final result)  Result time 03/26/24 22:35:49      Final result by Skyla Davis MD (03/26/24 22:35:49)                   Narrative:    EXAM:  MR Head Without Intravenous Contrast    CLINICAL HISTORY:  The patient is 49 years old and is Male; Transient ischemic attack (TIA); Left side arm and Leg Numbness/Tingling    TECHNIQUE:  Magnetic resonance images of the head/brain without intravenous contrast in multiple planes.    COMPARISON:  CT head dated 03/26/2024    FINDINGS:  BRAIN:  Brain volume appropriate for patient age. No mass effect. Few nonspecific foci of T2/FLAIR signal hyperintensity in the periventricular white matter which may reflect the sequela of chronic small vessel ischemic disease.  No hemorrhage. No evidence of acute infarction.  VENTRICLES:  Unremarkable.  No ventriculomegaly.  BONES/JOINTS:  Unremarkable.  No acute fracture.  SINUSES:  Mild mucosal thickening in the bilateral ethmoid air cells. No air-fluid levels.  MASTOID AIR CELLS:  Unremarkable as visualized.  No mastoid effusion.  ORBITS:  Unremarkable as visualized.    IMPRESSION:  No acute intracranial findings. No evidence of acute infarction.    Electronically signed by:  Skyla Davis MD  03/26/2024 10:35 PM CDT Workstation: [id number]                                     CT HEAD FOR STROKE (Edited Result - FINAL)  Result time 03/26/24 22:08:42   Procedure changed from CT Head Without Contrast     Edited Result - FINAL by Yoshi De Santiago MD (03/26/24 22:08:42)                   Narrative:         ADDENDUM #1       Critical findings were communicated to  at 11:05 PM Eastern standard time on  3/26/2024.    Electronically signed by:  Yoshi De Santiago MD  03/26/2024 10:08 PM CDT Workstation: [id number]       ORIGINAL REPORT       INDICATION: Transient ischemic attack (TIA)    EXAMINATION: CT BRAIN - CT HEAD WITHOUT IV CONTRAST    TECHNIQUE:  Multiple axial images were obtained of the head without intravenous contrast. A radiation dose optimization technique was used for this scan.  IV Contrast dosage and agent: None.    COMPARISON: None  ____________________________________________    FINDINGS:    BRAIN PARENCHYMA:  No intra- or extra-axial hemorrhage. Grey white differentiation is preserved. No intracranial mass or mass effect. Posterior fossa structures are unremarkable.    CSF SPACES: Appropriate for age.  No hydrocephalus.    CALVARIUM, SKULL BASE, PARANASAL SINUSES AND MASTOID AIR CELLS:  Unremarkable    IMPRESSION:  No acute process    Electronically signed by:  Yoshi De Santiago MD  03/26/2024 09:58 PM CDT Workstation: [id number]                      Final result by Yoshi De Santiago MD (03/26/24 21:58:55)                   Narrative:    INDICATION: Transient ischemic attack (TIA)    EXAMINATION: CT BRAIN - CT HEAD WITHOUT IV CONTRAST    TECHNIQUE:  Multiple axial images were obtained of the head without intravenous contrast. A radiation dose optimization technique was used for this scan.  IV Contrast dosage and agent: None.    COMPARISON: None  ____________________________________________    FINDINGS:    BRAIN PARENCHYMA:  No intra- or extra-axial hemorrhage. Grey white differentiation is preserved. No intracranial mass or mass effect. Posterior fossa structures are unremarkable.    CSF SPACES: Appropriate for age.  No hydrocephalus.    CALVARIUM, SKULL BASE, PARANASAL SINUSES AND MASTOID AIR CELLS:  Unremarkable    IMPRESSION:  No acute process    Electronically signed by:  Yoshi De Santiago MD  03/26/2024 09:58 PM CDT Workstation: [id number]                                     Medications - No data to display    Medical  Decision Making  49-year-old male presents for intermittent paresthesias of the left upper extremity, left side of the face and left lower extremity ongoing for the past 36 hours.  Vitals here notable for mild tachycardia, otherwise normal.  Aside from sensory discrepancy from right arm to left arm, patient has no focal neurological deficit on my exam.  Given duration of symptoms, patient was not stroke activated.  Given recent similar workups at outside hospitals, including CT head and CTA without acute abnormalities, CT head without contrast repeated here shows no acute intracranial bleed.  MRI brain ordered to assess for other space-occupying or demyelinating diseases, MRI brain today without acute abnormality.  Patient r remained symptom free here..  Other workup within normal limits.  Patient stable for discharge with outpatient Neurology follow-up.    Amount and/or Complexity of Data Reviewed  Labs: ordered.  Radiology: ordered.                                      Clinical Impression:  Final diagnoses:  [R00.0] Tachycardia  [R20.2] Paresthesias (Primary)          ED Disposition Condition    Discharge Stable          ED Prescriptions    None       Follow-up Information       Follow up With Specialties Details Why Contact [facility]  Schedule an appointment as soon as possible for a visit in 1 day to establish primary care physician Greg GARCIA [id number]  [phone]               Keron Kelsey MD  03/27/24 [id number]

## 2024-03-27 NOTE — DISCHARGE INSTRUCTIONS
